# Patient Record
Sex: MALE | Race: WHITE
[De-identification: names, ages, dates, MRNs, and addresses within clinical notes are randomized per-mention and may not be internally consistent; named-entity substitution may affect disease eponyms.]

---

## 2021-01-18 ENCOUNTER — HOSPITAL ENCOUNTER (EMERGENCY)
Dept: HOSPITAL 74 - FER | Age: 21
Discharge: HOME | End: 2021-01-18
Payer: COMMERCIAL

## 2021-01-18 VITALS — SYSTOLIC BLOOD PRESSURE: 131 MMHG | TEMPERATURE: 98.8 F | DIASTOLIC BLOOD PRESSURE: 87 MMHG | HEART RATE: 94 BPM

## 2021-01-18 VITALS — BODY MASS INDEX: 21.5 KG/M2

## 2021-01-18 DIAGNOSIS — S51.852A: Primary | ICD-10-CM
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
## 2021-01-18 PROCEDURE — 3E0234Z INTRODUCTION OF SERUM, TOXOID AND VACCINE INTO MUSCLE, PERCUTANEOUS APPROACH: ICD-10-PCS

## 2022-04-06 ENCOUNTER — NON-APPOINTMENT (OUTPATIENT)
Age: 22
End: 2022-04-06

## 2022-04-07 ENCOUNTER — APPOINTMENT (OUTPATIENT)
Dept: INTERNAL MEDICINE | Facility: CLINIC | Age: 22
End: 2022-04-07
Payer: COMMERCIAL

## 2022-04-07 VITALS
HEIGHT: 70 IN | RESPIRATION RATE: 16 BRPM | HEART RATE: 74 BPM | SYSTOLIC BLOOD PRESSURE: 110 MMHG | OXYGEN SATURATION: 98 % | BODY MASS INDEX: 23.91 KG/M2 | WEIGHT: 167 LBS | DIASTOLIC BLOOD PRESSURE: 74 MMHG

## 2022-04-07 DIAGNOSIS — L64.9 ANDROGENIC ALOPECIA, UNSPECIFIED: ICD-10-CM

## 2022-04-07 DIAGNOSIS — Z78.9 OTHER SPECIFIED HEALTH STATUS: ICD-10-CM

## 2022-04-07 DIAGNOSIS — Z00.00 ENCOUNTER FOR GENERAL ADULT MEDICAL EXAMINATION W/OUT ABNORMAL FINDINGS: ICD-10-CM

## 2022-04-07 PROCEDURE — 99385 PREV VISIT NEW AGE 18-39: CPT

## 2022-04-07 PROCEDURE — 36415 COLL VENOUS BLD VENIPUNCTURE: CPT

## 2022-04-07 RX ORDER — FINASTERIDE 1 MG/1
1 TABLET ORAL
Qty: 30 | Refills: 6 | Status: ACTIVE | COMMUNITY
Start: 2022-04-07

## 2022-04-07 NOTE — PHYSICAL EXAM
[No Edema] : there was no peripheral edema [Normal] : no posterior cervical lymphadenopathy and no anterior cervical lymphadenopathy [de-identified] : mild tendeness lateral aspect right foot

## 2022-04-07 NOTE — HISTORY OF PRESENT ILLNESS
[FreeTextEntry1] : wellness [de-identified] : THis is a 22 yo m here for wellness\par \par Had covid and lyme both summer 2021  both fully resolved.\par last week running felt pain on right side of foot outside -worse later - not going away.  some discomfort\par \par senior at [employer].      \par sexual active msm

## 2022-04-08 LAB
ALBUMIN SERPL ELPH-MCNC: 5.1 G/DL
ALP BLD-CCNC: 58 U/L
ALT SERPL-CCNC: 15 U/L
ANION GAP SERPL CALC-SCNC: 13 MMOL/L
APPEARANCE: CLEAR
AST SERPL-CCNC: 22 U/L
BASOPHILS # BLD AUTO: 0.04 K/UL
BASOPHILS NFR BLD AUTO: 0.8 %
BILIRUB SERPL-MCNC: 0.2 MG/DL
BILIRUBIN URINE: NEGATIVE
BLOOD URINE: NEGATIVE
BUN SERPL-MCNC: 10 MG/DL
CALCIUM SERPL-MCNC: 9.5 MG/DL
CHLORIDE SERPL-SCNC: 103 MMOL/L
CHOLEST SERPL-MCNC: 154 MG/DL
CO2 SERPL-SCNC: 24 MMOL/L
COLOR: NORMAL
CREAT SERPL-MCNC: 0.9 MG/DL
EGFR: 125 ML/MIN/1.73M2
EOSINOPHIL # BLD AUTO: 0.13 K/UL
EOSINOPHIL NFR BLD AUTO: 2.5 %
GLUCOSE QUALITATIVE U: NEGATIVE
GLUCOSE SERPL-MCNC: 97 MG/DL
HCT VFR BLD CALC: 45.4 %
HDLC SERPL-MCNC: 79 MG/DL
HGB BLD-MCNC: 13.9 G/DL
IMM GRANULOCYTES NFR BLD AUTO: 0 %
KETONES URINE: NEGATIVE
LDLC SERPL CALC-MCNC: 68 MG/DL
LEUKOCYTE ESTERASE URINE: NEGATIVE
LYMPHOCYTES # BLD AUTO: 2.1 K/UL
LYMPHOCYTES NFR BLD AUTO: 40 %
MAN DIFF?: NORMAL
MCHC RBC-ENTMCNC: 30.5 PG
MCHC RBC-ENTMCNC: 30.6 GM/DL
MCV RBC AUTO: 99.6 FL
MONOCYTES # BLD AUTO: 0.75 K/UL
MONOCYTES NFR BLD AUTO: 14.3 %
NEUTROPHILS # BLD AUTO: 2.23 K/UL
NEUTROPHILS NFR BLD AUTO: 42.4 %
NITRITE URINE: NEGATIVE
NONHDLC SERPL-MCNC: 76 MG/DL
PH URINE: 6.5
PLATELET # BLD AUTO: 238 K/UL
POTASSIUM SERPL-SCNC: 4.5 MMOL/L
PROT SERPL-MCNC: 7.6 G/DL
PROTEIN URINE: NEGATIVE
RBC # BLD: 4.56 M/UL
RBC # FLD: 13.3 %
SODIUM SERPL-SCNC: 139 MMOL/L
SPECIFIC GRAVITY URINE: 1
TRIGL SERPL-MCNC: 40 MG/DL
UROBILINOGEN URINE: NORMAL
WBC # FLD AUTO: 5.25 K/UL

## 2023-02-16 ENCOUNTER — APPOINTMENT (OUTPATIENT)
Dept: OTOLARYNGOLOGY | Facility: CLINIC | Age: 23
End: 2023-02-16

## 2023-02-16 ENCOUNTER — APPOINTMENT (OUTPATIENT)
Dept: OTOLARYNGOLOGY | Facility: CLINIC | Age: 23
End: 2023-02-16
Payer: COMMERCIAL

## 2023-02-16 VITALS
HEART RATE: 62 BPM | DIASTOLIC BLOOD PRESSURE: 85 MMHG | HEIGHT: 71 IN | WEIGHT: 170 LBS | SYSTOLIC BLOOD PRESSURE: 127 MMHG | RESPIRATION RATE: 16 BRPM | OXYGEN SATURATION: 98 % | BODY MASS INDEX: 23.8 KG/M2 | TEMPERATURE: 98 F

## 2023-02-16 DIAGNOSIS — J30.1 ALLERGIC RHINITIS DUE TO POLLEN: ICD-10-CM

## 2023-02-16 DIAGNOSIS — J34.2 DEVIATED NASAL SEPTUM: ICD-10-CM

## 2023-02-16 DIAGNOSIS — R09.82 POSTNASAL DRIP: ICD-10-CM

## 2023-02-16 PROCEDURE — 99203 OFFICE O/P NEW LOW 30 MIN: CPT | Mod: 25

## 2023-02-16 PROCEDURE — 31231 NASAL ENDOSCOPY DX: CPT

## 2023-02-16 RX ORDER — FLUTICASONE PROPIONATE 50 UG/1
50 SPRAY, METERED NASAL DAILY
Qty: 3 | Refills: 1 | Status: ACTIVE | COMMUNITY
Start: 2023-02-16 | End: 1900-01-01

## 2023-02-16 NOTE — REASON FOR VISIT
[Initial Evaluation] : an initial evaluation for [FreeTextEntry2] : postnasal drip, cough, sinus issues, headaches

## 2023-02-16 NOTE — PHYSICAL EXAM
[Midline] : trachea located in midline position [Nasal Endoscopy Performed] : nasal endoscopy was performed, see procedure section for findings [] : septum deviated to the left [Normal] : no neck adenopathy [de-identified] : ar [de-identified] : ar [de-identified] : gait steady

## 2023-02-16 NOTE — ASSESSMENT
[FreeTextEntry1] : AR\par -scope appeared allergic \par -avoid allergens\par -recommended making home dust free\par -pt has a dog, rec dog sleeps in another room, frequent baths \par -Flonase\par -Allegra D PRN \par -for congestion in his lungs recommended he followup with internist \par RTC in 2-3 weeks --> consider imaging study if sx do not improve

## 2023-02-16 NOTE — HISTORY OF PRESENT ILLNESS
[de-identified] : 23 y/o M is presenting with recurrent sinus infxns for the past 4 hernandez. He is c/o postnasal drip, sinus pressure, and headaches which start in the winter and typically resolve when the weather is warmer. He has h/o seasonal allergies. He has a dog that does not sleep in his bed. He has used Flonase in the past. Nonsmoker. He feels he can breathe through his nose comfortably. He feels he has congestion in his lungs. No fh relevant to cc.

## 2023-02-16 NOTE — PROCEDURE
[Anterior rhinoscopy insufficient to account for symptoms] : anterior rhinoscopy insufficient to account for symptoms [None] : none [Flexible Endoscope] : examined with the flexible endoscope [Normal] : the paranasal sinuses had no abnormalities [Posterior Lesion] : posterior lesion [Allergic] : allergic signs [Deviated to the Lt] : deviated to the left [Serial Number: ___] : Serial Number: [unfilled] [FreeTextEntry6] : done for chronic postnasal drip \par nose appears allergic, l ds but can move air thru nose

## 2023-06-08 RX ORDER — ALPRAZOLAM 0.25 MG/1
0.25 TABLET ORAL
Qty: 6 | Refills: 0 | Status: ACTIVE | COMMUNITY
Start: 2023-06-08 | End: 1900-01-01